# Patient Record
Sex: FEMALE | URBAN - METROPOLITAN AREA
[De-identification: names, ages, dates, MRNs, and addresses within clinical notes are randomized per-mention and may not be internally consistent; named-entity substitution may affect disease eponyms.]

---

## 2022-07-09 ENCOUNTER — OFFICE VISIT (OUTPATIENT)
Dept: URGENT CARE | Facility: CLINIC | Age: 32
End: 2022-07-09
Payer: COMMERCIAL

## 2022-07-09 VITALS
DIASTOLIC BLOOD PRESSURE: 69 MMHG | SYSTOLIC BLOOD PRESSURE: 115 MMHG | WEIGHT: 141 LBS | HEIGHT: 64 IN | BODY MASS INDEX: 24.07 KG/M2 | OXYGEN SATURATION: 98 % | RESPIRATION RATE: 18 BRPM | TEMPERATURE: 98 F | HEART RATE: 67 BPM

## 2022-07-09 DIAGNOSIS — R50.9 FEVER, UNSPECIFIED FEVER CAUSE: ICD-10-CM

## 2022-07-09 DIAGNOSIS — R10.30 LOWER ABDOMINAL PAIN: Primary | ICD-10-CM

## 2022-07-09 LAB
BILIRUB UR QL STRIP: POSITIVE
GLUCOSE UR QL STRIP: NEGATIVE
KETONES UR QL STRIP: NEGATIVE
LEUKOCYTE ESTERASE UR QL STRIP: NEGATIVE
PH, POC UA: 7
POC BLOOD, URINE: POSITIVE
POC NITRATES, URINE: NEGATIVE
PROT UR QL STRIP: NEGATIVE
SP GR UR STRIP: 1.02 (ref 1–1.03)
UROBILINOGEN UR STRIP-ACNC: POSITIVE (ref 0.1–1.1)

## 2022-07-09 PROCEDURE — 99204 OFFICE O/P NEW MOD 45 MIN: CPT | Mod: S$GLB,,, | Performed by: NURSE PRACTITIONER

## 2022-07-09 PROCEDURE — 99204 PR OFFICE/OUTPT VISIT, NEW, LEVL IV, 45-59 MIN: ICD-10-PCS | Mod: S$GLB,,, | Performed by: NURSE PRACTITIONER

## 2022-07-09 PROCEDURE — 81003 POCT URINALYSIS, DIPSTICK, AUTOMATED, W/O SCOPE: ICD-10-PCS | Mod: QW,S$GLB,, | Performed by: NURSE PRACTITIONER

## 2022-07-09 PROCEDURE — 81003 URINALYSIS AUTO W/O SCOPE: CPT | Mod: QW,S$GLB,, | Performed by: NURSE PRACTITIONER

## 2022-07-09 RX ORDER — RIVAROXABAN 20 MG/1
TABLET, FILM COATED ORAL
COMMUNITY
Start: 2022-06-18

## 2022-07-09 NOTE — LETTER
July 9, 2022      [facility] [place]  [address]  [place] [address]  Phone: [phone]  Fax: [phone]       Patient: Lynn Rodgers   YOB: 1990  Date of Visit: 07/09/2022    To Whom It May Concern:    Desire Rodgers  was at [facility] on 07/09/2022. The patient may return to work/school on *** {With/no:19513} restrictions. If you have any questions or concerns, or if I can be of further assistance, please do not hesitate to contact me.    Sincerely,    Enriqueta Lemus

## 2022-07-09 NOTE — PROGRESS NOTES
"CHIEF COMPLAINT  Chief Complaint   Patient presents with    Urinary Tract Infection     X 3 days        HPI  Lynn Hernandez a 32 y.o. female who presents with c/o lower pelvic pain. Pt reports symptoms started 3 days ago and she called her urologist yesterday with complaints and had a "rescue treatment" completed for history of interstitial cystitis. Pt reports last night she woke up with a fever that resolved with tylenol. Pt has not had any fever yet today. Pt denies hematuria but reports blood frequently shows up on UA. Pt also reports history of chron's which often mimics IC pain. Pt reports generalized abdominal tenderness but extreme tenderness to lower pelvic region that is worse with palpation or movement. Pt denies burning with urination, n/v/d, rash, chest pain, sob, back pain or vaginal discharge.       CURRENT MEDICATIONS  Current Outpatient Medications on File Prior to Visit   Medication Sig Dispense Refill    XARELTO 20 mg Tab SMARTSI Tablet(s) By Mouth Every Evening       No current facility-administered medications on file prior to visit.       ALLERGIES  Review of patient's allergies indicates:  No Known Allergies      There is no immunization history on file for this patient.    PAST MEDICAL HISTORY  History reviewed. No pertinent past medical history.    SURGICAL HISTORY  History reviewed. No pertinent surgical history.    SOCIAL HISTORY  Social History     Socioeconomic History    Marital status: Unknown   Tobacco Use    Smoking status: Never Smoker    Smokeless tobacco: Never Used   Substance and Sexual Activity    Alcohol use: Not Currently    Drug use: Never    Sexual activity: Not Currently       FAMILY HISTORY  History reviewed. No pertinent family history.    REVIEW OF SYSTEMS  Constitutional: + fever, no chills, or weakness.  Respiratory: No cough, wheezing or shortness of breath  Cardiovascular: No chest pain, palpitations or edema  GI: + abdominal and pelvic pain, no nausea, " "vomiting or diarrhea  Gu: No dysuria, no hematuria, or discharge  Musculoskeletal: No pain, full range of motion. Good sensation  Skin: No rash or abrasion  Neurologic: No focal weakness or sensory changes.  All systems otherwise negative except as noted in the Review of Systems and History of Present Illness      PHYSICAL EXAM  Reviewed Triage Note  VITAL SIGNS: 115/69  Constitutional: Well developed, well nourished, Alert and oriented x3, No acute distress, non-toxic appearance.  HENT: Normocephalic, Atraumatic, Bilateral external ears normal, external nose negative, oropharynx moist, No oral exudates.  Eyes: PERRL, EOMI, Conjunctiva normal, No discharge.  Neck: Normal range of motion, no tenderness, supple  Respiratory: Normal breath sounds, no respiratory distress  Cardiovascular: HR 67, normal rhythm, no murmurs, no rubs, no gallops.  Gi: Bowel sounds normal, soft, tenderness to generalized abdomen and hypersensitive to touch to pelvic region, non-distended  Musculoskeletal:  Good range of motion in all major joints. No tenderness to palpation or major deformities noted.   Integument: Warm, Dry, No erythema, no rash  Psychiatric: Crying      LABS  Pertinent labs reviewed. (see chart for details)  UA + blood + bilirubin     RADIOLOGY  No orders to display         PROCEDURE  Procedures        Physical exam findings and lab results discussed with patient. Discussed concerns and need for further evaluation, pt crying states, " I don't want to go to the ER, I've been hospitalized so many times for this stuff." Pt reports she will monitor for further temperature or any worsening condition and then seek emergent care. Pt states she prefers to follow up with urology.     DISPOSITION  Patient discharged in stable condition     CLINICAL IMPRESSION:  The primary encounter diagnosis was Lower abdominal pain. A diagnosis of Fever, unspecified fever cause was also pertinent to this visit.    Patient advised to follow-up with " your PCP within 3 days for BP re-check if Blood Pressure was >120/80 without history of hypertension.

## 2022-07-09 NOTE — PROGRESS NOTES
"Subjective:       Patient ID: Lynn Rodgers is a 32 y.o. female.    Vitals:  height is 5' 4" (1.626 m) and weight is 64 kg (141 lb). Her oral temperature is 98.1 °F (36.7 °C). Her blood pressure is 115/69 and her pulse is 67. Her respiration is 18 and oxygen saturation is 98%.     Chief Complaint: Urinary Tract Infection (X 3 days )    Urinary Tract Infection   This is a new problem. The current episode started in the past 7 days. The problem has been gradually worsening. The quality of the pain is described as aching and stabbing. The pain is at a severity of 8/10. The pain is severe. The maximum temperature recorded prior to her arrival was 101 - 101.9 F.     ROS    Objective:      Physical Exam      Assessment:       No diagnosis found.      Plan:         There are no diagnoses linked to this encounter.               "